# Patient Record
Sex: FEMALE | Race: WHITE | NOT HISPANIC OR LATINO | Employment: FULL TIME | URBAN - METROPOLITAN AREA
[De-identification: names, ages, dates, MRNs, and addresses within clinical notes are randomized per-mention and may not be internally consistent; named-entity substitution may affect disease eponyms.]

---

## 2017-08-18 ENCOUNTER — CONVERSION ENCOUNTER (OUTPATIENT)
Dept: URGENT CARE | Facility: CLINIC | Age: 57
End: 2017-08-18

## 2017-10-31 ENCOUNTER — CONVERSION ENCOUNTER (OUTPATIENT)
Dept: URGENT CARE | Facility: CLINIC | Age: 57
End: 2017-10-31

## 2019-06-03 VITALS
HEART RATE: 77 BPM | RESPIRATION RATE: 18 BRPM | DIASTOLIC BLOOD PRESSURE: 75 MMHG | HEIGHT: 67 IN | DIASTOLIC BLOOD PRESSURE: 77 MMHG | BODY MASS INDEX: 40.97 KG/M2 | OXYGEN SATURATION: 96 % | HEART RATE: 76 BPM | WEIGHT: 261 LBS | RESPIRATION RATE: 20 BRPM | OXYGEN SATURATION: 96 % | SYSTOLIC BLOOD PRESSURE: 130 MMHG | WEIGHT: 255.2 LBS | SYSTOLIC BLOOD PRESSURE: 146 MMHG

## 2020-06-16 ENCOUNTER — OFFICE (OUTPATIENT)
Dept: URBAN - METROPOLITAN AREA CLINIC 64 | Facility: CLINIC | Age: 60
End: 2020-06-16

## 2020-06-16 VITALS
HEIGHT: 66 IN | DIASTOLIC BLOOD PRESSURE: 88 MMHG | SYSTOLIC BLOOD PRESSURE: 173 MMHG | WEIGHT: 264 LBS | HEART RATE: 76 BPM

## 2020-06-16 DIAGNOSIS — R12 HEARTBURN: ICD-10-CM

## 2020-06-16 DIAGNOSIS — R10.84 GENERALIZED ABDOMINAL PAIN: ICD-10-CM

## 2020-06-16 DIAGNOSIS — Z86.010 PERSONAL HISTORY OF COLONIC POLYPS: ICD-10-CM

## 2020-06-16 DIAGNOSIS — B15.9 HEPATITIS A WITHOUT HEPATIC COMA: ICD-10-CM

## 2020-06-16 DIAGNOSIS — R21 RASH AND OTHER NONSPECIFIC SKIN ERUPTION: ICD-10-CM

## 2020-06-16 PROCEDURE — 99243 OFF/OP CNSLTJ NEW/EST LOW 30: CPT | Performed by: NURSE PRACTITIONER

## 2020-06-16 RX ORDER — PANTOPRAZOLE SODIUM 40 MG/1
40 TABLET, DELAYED RELEASE ORAL
Qty: 30 | Refills: 11 | Status: ACTIVE
Start: 2020-06-16

## 2020-06-16 RX ORDER — DICYCLOMINE HYDROCHLORIDE 20 MG/1
60 TABLET ORAL
Qty: 270 | Refills: 3 | Status: ACTIVE
Start: 2020-06-16

## 2021-10-25 ENCOUNTER — OFFICE VISIT (OUTPATIENT)
Dept: CARDIOLOGY | Facility: CLINIC | Age: 61
End: 2021-10-25

## 2021-10-25 ENCOUNTER — PREP FOR SURGERY (OUTPATIENT)
Dept: OTHER | Facility: HOSPITAL | Age: 61
End: 2021-10-25

## 2021-10-25 VITALS
OXYGEN SATURATION: 96 % | HEIGHT: 55 IN | BODY MASS INDEX: 54.62 KG/M2 | WEIGHT: 236 LBS | SYSTOLIC BLOOD PRESSURE: 119 MMHG | HEART RATE: 61 BPM | DIASTOLIC BLOOD PRESSURE: 70 MMHG

## 2021-10-25 DIAGNOSIS — R06.83 SNORING: ICD-10-CM

## 2021-10-25 DIAGNOSIS — I48.19 PERSISTENT ATRIAL FIBRILLATION (HCC): ICD-10-CM

## 2021-10-25 DIAGNOSIS — I10 HYPERTENSION, UNSPECIFIED TYPE: ICD-10-CM

## 2021-10-25 DIAGNOSIS — I48.19 PERSISTENT ATRIAL FIBRILLATION (HCC): Primary | ICD-10-CM

## 2021-10-25 DIAGNOSIS — E78.5 HYPERLIPIDEMIA, UNSPECIFIED HYPERLIPIDEMIA TYPE: ICD-10-CM

## 2021-10-25 DIAGNOSIS — R00.2 PALPITATIONS: Primary | ICD-10-CM

## 2021-10-25 PROBLEM — I48.0 AF (PAROXYSMAL ATRIAL FIBRILLATION) (HCC): Status: ACTIVE | Noted: 2021-10-25

## 2021-10-25 PROBLEM — E11.65 TYPE 2 DIABETES MELLITUS WITH HYPERGLYCEMIA (HCC): Status: ACTIVE | Noted: 2021-10-25

## 2021-10-25 PROCEDURE — 93000 ELECTROCARDIOGRAM COMPLETE: CPT | Performed by: INTERNAL MEDICINE

## 2021-10-25 PROCEDURE — 99204 OFFICE O/P NEW MOD 45 MIN: CPT | Performed by: INTERNAL MEDICINE

## 2021-10-25 RX ORDER — CITALOPRAM 20 MG/1
20 TABLET ORAL DAILY
COMMUNITY
Start: 2021-09-12

## 2021-10-25 RX ORDER — LISINOPRIL 5 MG/1
5 TABLET ORAL DAILY
COMMUNITY
Start: 2021-08-04

## 2021-10-25 RX ORDER — SODIUM CHLORIDE 9 MG/ML
80 INJECTION, SOLUTION INTRAVENOUS CONTINUOUS
Status: CANCELLED | OUTPATIENT
Start: 2021-10-25

## 2021-10-25 RX ORDER — FENOFIBRATE 134 MG/1
134 CAPSULE ORAL
COMMUNITY
Start: 2021-08-16

## 2021-10-25 RX ORDER — ACETAMINOPHEN 500 MG
500 TABLET ORAL EVERY 6 HOURS PRN
COMMUNITY

## 2021-10-25 RX ORDER — SODIUM CHLORIDE 0.9 % (FLUSH) 0.9 %
3-10 SYRINGE (ML) INJECTION AS NEEDED
Status: CANCELLED | OUTPATIENT
Start: 2021-10-25

## 2021-10-25 RX ORDER — SODIUM CHLORIDE 0.9 % (FLUSH) 0.9 %
3 SYRINGE (ML) INJECTION EVERY 12 HOURS SCHEDULED
Status: CANCELLED | OUTPATIENT
Start: 2021-10-25

## 2021-10-25 RX ORDER — SACCHAROMYCES BOULARDII 250 MG
250 CAPSULE ORAL 2 TIMES DAILY
COMMUNITY
End: 2021-11-15

## 2021-10-25 RX ORDER — APIXABAN 5 MG/1
5 TABLET, FILM COATED ORAL 2 TIMES DAILY
COMMUNITY
Start: 2021-10-19

## 2021-10-25 RX ORDER — GLIMEPIRIDE 4 MG/1
4 TABLET ORAL 2 TIMES DAILY
COMMUNITY
Start: 2021-08-26

## 2021-10-25 NOTE — PROGRESS NOTES
CC--persistent sx AF, HTN    Sub---referred for sx AF --failed cardiuoversion  Has HTN, congenital adrenal hyperplasia and prior CV work up negative  Has fatigue and snoring and PND  No angina  No lkeg edema  Has DM   no CVA or bleeding issues    Past Medical History:   Diagnosis Date   • Atrial fibrillation (HCC)    • Diabetes mellitus (HCC)    • Hyperlipidemia      Past Surgical History:   Procedure Laterality Date   • SHOULDER SURGERY       Family History   Problem Relation Age of Onset   • Heart failure Mother    • Hypertension Father          Review of Systems   General:  positive for fatigue and tiredness  Eyes: No redness  Cardiovascular: No chest pain, no palpitations  Respiratory:   positive for class 3 shortness of breath        Physical Exam  VITALS REVIEWED    General:      well developed, well nourished, in no acute distress.    Head:      normocephalic and atraumatic.    Eyes:      PERRL/EOM intact, conjunctiva and sclera clear with out nystagmus.    Neck:      no masses, thyromegaly,  trachea central with normal respiratory effort and PMI displaced laterally  Lungs:      clear bilaterally to auscultation.    Heart:       underlying  atrial fibrillation with irregularly irregular rhythm and without any murmurs gallops or rubs        A/P    Persistent sx AF--options educated and orders placed  Snoring with obesity--wt 236 lbs  HTN  DM  CHADS vasc score--female, diastolic chf, htn, DM--4      ECG 12 Lead    Date/Time: 10/25/2021 10:17 AM  Performed by: Andres Baer MD  Authorized by: Andres Baer MD   Comparison: compared with previous ECG   Similar to previous ECG  Rhythm: atrial fibrillation  Rate: normal  Conduction: conduction normal  QRS axis: normal  Other findings: non-specific ST-T wave changes          Electronically signed by Andres Baer MD, 10/25/21, 10:17 AM EDT.

## 2021-11-13 ENCOUNTER — LAB (OUTPATIENT)
Dept: LAB | Facility: HOSPITAL | Age: 61
End: 2021-11-13

## 2021-11-13 DIAGNOSIS — I48.19 PERSISTENT ATRIAL FIBRILLATION (HCC): ICD-10-CM

## 2021-11-13 DIAGNOSIS — R06.83 SNORING: ICD-10-CM

## 2021-11-13 DIAGNOSIS — I10 HYPERTENSION, UNSPECIFIED TYPE: ICD-10-CM

## 2021-11-13 LAB
ALBUMIN SERPL-MCNC: 4.7 G/DL (ref 3.5–5.2)
ALBUMIN/GLOB SERPL: 1.6 G/DL
ALP SERPL-CCNC: 42 U/L (ref 39–117)
ALT SERPL W P-5'-P-CCNC: 10 U/L (ref 1–33)
ANION GAP SERPL CALCULATED.3IONS-SCNC: 11.8 MMOL/L (ref 5–15)
AST SERPL-CCNC: 24 U/L (ref 1–32)
BASOPHILS # BLD AUTO: 0.06 10*3/MM3 (ref 0–0.2)
BASOPHILS NFR BLD AUTO: 0.7 % (ref 0–1.5)
BILIRUB SERPL-MCNC: 0.4 MG/DL (ref 0–1.2)
BUN SERPL-MCNC: 13 MG/DL (ref 8–23)
BUN/CREAT SERPL: 20.6 (ref 7–25)
CALCIUM SPEC-SCNC: 10 MG/DL (ref 8.6–10.5)
CHLORIDE SERPL-SCNC: 102 MMOL/L (ref 98–107)
CO2 SERPL-SCNC: 25.2 MMOL/L (ref 22–29)
CREAT SERPL-MCNC: 0.63 MG/DL (ref 0.57–1)
DEPRECATED RDW RBC AUTO: 46.6 FL (ref 37–54)
EOSINOPHIL # BLD AUTO: 0.12 10*3/MM3 (ref 0–0.4)
EOSINOPHIL NFR BLD AUTO: 1.4 % (ref 0.3–6.2)
ERYTHROCYTE [DISTWIDTH] IN BLOOD BY AUTOMATED COUNT: 13.9 % (ref 12.3–15.4)
GFR SERPL CREATININE-BSD FRML MDRD: 96 ML/MIN/1.73
GLOBULIN UR ELPH-MCNC: 2.9 GM/DL
GLUCOSE SERPL-MCNC: 125 MG/DL (ref 65–99)
HCT VFR BLD AUTO: 49.9 % (ref 34–46.6)
HGB BLD-MCNC: 16.6 G/DL (ref 12–15.9)
IMM GRANULOCYTES # BLD AUTO: 0.03 10*3/MM3 (ref 0–0.05)
IMM GRANULOCYTES NFR BLD AUTO: 0.3 % (ref 0–0.5)
LYMPHOCYTES # BLD AUTO: 3.39 10*3/MM3 (ref 0.7–3.1)
LYMPHOCYTES NFR BLD AUTO: 38.4 % (ref 19.6–45.3)
MAGNESIUM SERPL-MCNC: 2.1 MG/DL (ref 1.6–2.4)
MCH RBC QN AUTO: 30.3 PG (ref 26.6–33)
MCHC RBC AUTO-ENTMCNC: 33.3 G/DL (ref 31.5–35.7)
MCV RBC AUTO: 91.2 FL (ref 79–97)
MONOCYTES # BLD AUTO: 0.99 10*3/MM3 (ref 0.1–0.9)
MONOCYTES NFR BLD AUTO: 11.2 % (ref 5–12)
NEUTROPHILS NFR BLD AUTO: 4.24 10*3/MM3 (ref 1.7–7)
NEUTROPHILS NFR BLD AUTO: 48 % (ref 42.7–76)
NRBC BLD AUTO-RTO: 0 /100 WBC (ref 0–0.2)
PLATELET # BLD AUTO: 243 10*3/MM3 (ref 140–450)
PMV BLD AUTO: 10.2 FL (ref 6–12)
POTASSIUM SERPL-SCNC: 5.1 MMOL/L (ref 3.5–5.2)
PROT SERPL-MCNC: 7.6 G/DL (ref 6–8.5)
RBC # BLD AUTO: 5.47 10*6/MM3 (ref 3.77–5.28)
SODIUM SERPL-SCNC: 139 MMOL/L (ref 136–145)
WBC # BLD AUTO: 8.83 10*3/MM3 (ref 3.4–10.8)

## 2021-11-13 PROCEDURE — 80053 COMPREHEN METABOLIC PANEL: CPT

## 2021-11-13 PROCEDURE — 36415 COLL VENOUS BLD VENIPUNCTURE: CPT

## 2021-11-13 PROCEDURE — 83735 ASSAY OF MAGNESIUM: CPT

## 2021-11-13 PROCEDURE — 85025 COMPLETE CBC W/AUTO DIFF WBC: CPT

## 2021-11-13 PROCEDURE — U0004 COV-19 TEST NON-CDC HGH THRU: HCPCS

## 2021-11-13 PROCEDURE — C9803 HOPD COVID-19 SPEC COLLECT: HCPCS

## 2021-11-14 LAB — SARS-COV-2 ORF1AB RESP QL NAA+PROBE: NOT DETECTED

## 2021-11-15 ENCOUNTER — TELEPHONE (OUTPATIENT)
Dept: CARDIOLOGY | Facility: CLINIC | Age: 61
End: 2021-11-15

## 2021-11-15 NOTE — TELEPHONE ENCOUNTER
Patient called requesting letter be sent to her employer stating when and where Ablation is scheduled.  Patient also requested that she needed to know when she could return to work.  Advised patient that will be determined by Dr. Baer post procedure and could not be included in the above letter at this time.  Letter to be faxed to  [employer].

## 2021-11-16 ENCOUNTER — ANESTHESIA (OUTPATIENT)
Dept: CARDIOLOGY | Facility: HOSPITAL | Age: 61
End: 2021-11-16

## 2021-11-16 ENCOUNTER — ANESTHESIA EVENT (OUTPATIENT)
Dept: CARDIOLOGY | Facility: HOSPITAL | Age: 61
End: 2021-11-16

## 2021-11-16 ENCOUNTER — HOSPITAL ENCOUNTER (OUTPATIENT)
Facility: HOSPITAL | Age: 61
Discharge: HOME | End: 2021-11-17
Attending: INTERNAL MEDICINE | Admitting: INTERNAL MEDICINE

## 2021-11-16 ENCOUNTER — APPOINTMENT (OUTPATIENT)
Dept: GENERAL RADIOLOGY | Facility: HOSPITAL | Age: 61
End: 2021-11-16

## 2021-11-16 ENCOUNTER — HOSPITAL ENCOUNTER (OUTPATIENT)
Dept: CARDIOLOGY | Facility: HOSPITAL | Age: 61
Discharge: HOME | End: 2021-11-16

## 2021-11-16 VITALS
BODY MASS INDEX: 37.44 KG/M2 | DIASTOLIC BLOOD PRESSURE: 66 MMHG | TEMPERATURE: 98.1 F | WEIGHT: 238.54 LBS | OXYGEN SATURATION: 99 % | RESPIRATION RATE: 14 BRPM | HEIGHT: 67 IN | SYSTOLIC BLOOD PRESSURE: 137 MMHG

## 2021-11-16 VITALS — OXYGEN SATURATION: 96 % | SYSTOLIC BLOOD PRESSURE: 104 MMHG | DIASTOLIC BLOOD PRESSURE: 52 MMHG

## 2021-11-16 DIAGNOSIS — R06.83 SNORING: ICD-10-CM

## 2021-11-16 DIAGNOSIS — I48.19 PERSISTENT ATRIAL FIBRILLATION (HCC): ICD-10-CM

## 2021-11-16 DIAGNOSIS — I10 HYPERTENSION, UNSPECIFIED TYPE: ICD-10-CM

## 2021-11-16 LAB
ACT BLD: 130 SECONDS (ref 89–137)
ACT BLD: 291 SECONDS (ref 89–137)
ACT BLD: 303 SECONDS (ref 89–137)
ACT BLD: 303 SECONDS (ref 89–137)
ACT BLD: 315 SECONDS (ref 89–137)
BH CV ECHO MEAS - EF(MOD-BP): 60 %
GLUCOSE BLDC GLUCOMTR-MCNC: 137 MG/DL (ref 70–105)
GLUCOSE BLDC GLUCOMTR-MCNC: 148 MG/DL (ref 70–105)
GLUCOSE BLDC GLUCOMTR-MCNC: 186 MG/DL (ref 70–105)
MAXIMAL PREDICTED HEART RATE: 159 BPM
STRESS TARGET HR: 135 BPM

## 2021-11-16 PROCEDURE — 0 IOPAMIDOL PER 1 ML: Performed by: INTERNAL MEDICINE

## 2021-11-16 PROCEDURE — C1733 CATH, EP, OTHR THAN COOL-TIP: HCPCS | Performed by: INTERNAL MEDICINE

## 2021-11-16 PROCEDURE — 25010000002 PROPOFOL 10 MG/ML EMULSION: Performed by: ANESTHESIOLOGY

## 2021-11-16 PROCEDURE — 93325 DOPPLER ECHO COLOR FLOW MAPG: CPT

## 2021-11-16 PROCEDURE — G0378 HOSPITAL OBSERVATION PER HR: HCPCS

## 2021-11-16 PROCEDURE — 93662 INTRACARDIAC ECG (ICE): CPT | Performed by: INTERNAL MEDICINE

## 2021-11-16 PROCEDURE — C1766 INTRO/SHEATH,STRBLE,NON-PEEL: HCPCS | Performed by: INTERNAL MEDICINE

## 2021-11-16 PROCEDURE — 93320 DOPPLER ECHO COMPLETE: CPT

## 2021-11-16 PROCEDURE — 93312 ECHO TRANSESOPHAGEAL: CPT

## 2021-11-16 PROCEDURE — C1894 INTRO/SHEATH, NON-LASER: HCPCS | Performed by: INTERNAL MEDICINE

## 2021-11-16 PROCEDURE — 93320 DOPPLER ECHO COMPLETE: CPT | Performed by: INTERNAL MEDICINE

## 2021-11-16 PROCEDURE — 93613 INTRACARDIAC EPHYS 3D MAPG: CPT | Performed by: INTERNAL MEDICINE

## 2021-11-16 PROCEDURE — C1732 CATH, EP, DIAG/ABL, 3D/VECT: HCPCS | Performed by: INTERNAL MEDICINE

## 2021-11-16 PROCEDURE — 71045 X-RAY EXAM CHEST 1 VIEW: CPT

## 2021-11-16 PROCEDURE — 93325 DOPPLER ECHO COLOR FLOW MAPG: CPT | Performed by: INTERNAL MEDICINE

## 2021-11-16 PROCEDURE — 25010000002 HEPARIN (PORCINE) PER 1000 UNITS: Performed by: ANESTHESIOLOGY

## 2021-11-16 PROCEDURE — C1759 CATH, INTRA ECHOCARDIOGRAPHY: HCPCS | Performed by: INTERNAL MEDICINE

## 2021-11-16 PROCEDURE — 93656 COMPRE EP EVAL ABLTJ ATR FIB: CPT | Performed by: INTERNAL MEDICINE

## 2021-11-16 PROCEDURE — 25010000002 FENTANYL CITRATE (PF) 100 MCG/2ML SOLUTION: Performed by: ANESTHESIOLOGY

## 2021-11-16 PROCEDURE — 25010000002 ONDANSETRON PER 1 MG: Performed by: ANESTHESIOLOGY

## 2021-11-16 PROCEDURE — 82962 GLUCOSE BLOOD TEST: CPT

## 2021-11-16 PROCEDURE — C1769 GUIDE WIRE: HCPCS | Performed by: INTERNAL MEDICINE

## 2021-11-16 PROCEDURE — C1893 INTRO/SHEATH, FIXED,NON-PEEL: HCPCS | Performed by: INTERNAL MEDICINE

## 2021-11-16 PROCEDURE — 93312 ECHO TRANSESOPHAGEAL: CPT | Performed by: INTERNAL MEDICINE

## 2021-11-16 PROCEDURE — 25010000002 PROTAMINE SULFATE PER 10 MG: Performed by: ANESTHESIOLOGY

## 2021-11-16 PROCEDURE — 25010000002 MORPHINE PER 10 MG: Performed by: ANESTHESIOLOGY

## 2021-11-16 PROCEDURE — C1730 CATH, EP, 19 OR FEW ELECT: HCPCS | Performed by: INTERNAL MEDICINE

## 2021-11-16 PROCEDURE — 25010000002 SUCCINYLCHOLINE PER 20 MG: Performed by: ANESTHESIOLOGY

## 2021-11-16 PROCEDURE — 85347 COAGULATION TIME ACTIVATED: CPT

## 2021-11-16 PROCEDURE — 25010000002 DEXAMETHASONE PER 1 MG: Performed by: ANESTHESIOLOGY

## 2021-11-16 RX ORDER — SUCCINYLCHOLINE CHLORIDE 20 MG/ML
INJECTION INTRAMUSCULAR; INTRAVENOUS AS NEEDED
Status: DISCONTINUED | OUTPATIENT
Start: 2021-11-16 | End: 2021-11-16 | Stop reason: SURG

## 2021-11-16 RX ORDER — LIDOCAINE HYDROCHLORIDE 20 MG/ML
INJECTION, SOLUTION EPIDURAL; INFILTRATION; INTRACAUDAL; PERINEURAL AS NEEDED
Status: DISCONTINUED | OUTPATIENT
Start: 2021-11-16 | End: 2021-11-16 | Stop reason: SURG

## 2021-11-16 RX ORDER — ONDANSETRON 2 MG/ML
4 INJECTION INTRAMUSCULAR; INTRAVENOUS EVERY 6 HOURS PRN
Status: DISCONTINUED | OUTPATIENT
Start: 2021-11-16 | End: 2021-11-17 | Stop reason: HOSPADM

## 2021-11-16 RX ORDER — ACETAMINOPHEN 325 MG/1
650 TABLET ORAL ONCE AS NEEDED
Status: DISCONTINUED | OUTPATIENT
Start: 2021-11-16 | End: 2021-11-16 | Stop reason: HOSPADM

## 2021-11-16 RX ORDER — ACETAMINOPHEN 500 MG
500 TABLET ORAL EVERY 6 HOURS PRN
Status: DISCONTINUED | OUTPATIENT
Start: 2021-11-16 | End: 2021-11-17 | Stop reason: HOSPADM

## 2021-11-16 RX ORDER — HEPARIN SODIUM 1000 [USP'U]/ML
INJECTION, SOLUTION INTRAVENOUS; SUBCUTANEOUS AS NEEDED
Status: DISCONTINUED | OUTPATIENT
Start: 2021-11-16 | End: 2021-11-16 | Stop reason: SURG

## 2021-11-16 RX ORDER — PROTAMINE SULFATE 10 MG/ML
INJECTION, SOLUTION INTRAVENOUS AS NEEDED
Status: DISCONTINUED | OUTPATIENT
Start: 2021-11-16 | End: 2021-11-16 | Stop reason: SURG

## 2021-11-16 RX ORDER — MORPHINE SULFATE 4 MG/ML
2 INJECTION, SOLUTION INTRAMUSCULAR; INTRAVENOUS
Status: DISCONTINUED | OUTPATIENT
Start: 2021-11-16 | End: 2021-11-16 | Stop reason: HOSPADM

## 2021-11-16 RX ORDER — HYDROCODONE BITARTRATE AND ACETAMINOPHEN 5; 325 MG/1; MG/1
1 TABLET ORAL EVERY 4 HOURS PRN
Status: DISCONTINUED | OUTPATIENT
Start: 2021-11-16 | End: 2021-11-17 | Stop reason: HOSPADM

## 2021-11-16 RX ORDER — MORPHINE SULFATE 4 MG/ML
INJECTION, SOLUTION INTRAMUSCULAR; INTRAVENOUS AS NEEDED
Status: DISCONTINUED | OUTPATIENT
Start: 2021-11-16 | End: 2021-11-16 | Stop reason: SURG

## 2021-11-16 RX ORDER — NALOXONE HCL 0.4 MG/ML
0.4 VIAL (ML) INJECTION
Status: DISCONTINUED | OUTPATIENT
Start: 2021-11-16 | End: 2021-11-17 | Stop reason: HOSPADM

## 2021-11-16 RX ORDER — SODIUM CHLORIDE 9 MG/ML
30 INJECTION, SOLUTION INTRAVENOUS CONTINUOUS
Status: DISCONTINUED | OUTPATIENT
Start: 2021-11-16 | End: 2021-11-17 | Stop reason: HOSPADM

## 2021-11-16 RX ORDER — FENTANYL CITRATE 50 UG/ML
INJECTION, SOLUTION INTRAMUSCULAR; INTRAVENOUS AS NEEDED
Status: DISCONTINUED | OUTPATIENT
Start: 2021-11-16 | End: 2021-11-16 | Stop reason: SURG

## 2021-11-16 RX ORDER — GLIPIZIDE 5 MG/1
10 TABLET ORAL
Status: DISCONTINUED | OUTPATIENT
Start: 2021-11-16 | End: 2021-11-17 | Stop reason: HOSPADM

## 2021-11-16 RX ORDER — CITALOPRAM 20 MG/1
20 TABLET ORAL DAILY
Status: DISCONTINUED | OUTPATIENT
Start: 2021-11-16 | End: 2021-11-17 | Stop reason: HOSPADM

## 2021-11-16 RX ORDER — ACETAMINOPHEN 650 MG/1
650 SUPPOSITORY RECTAL ONCE AS NEEDED
Status: DISCONTINUED | OUTPATIENT
Start: 2021-11-16 | End: 2021-11-16 | Stop reason: HOSPADM

## 2021-11-16 RX ORDER — ONDANSETRON 2 MG/ML
4 INJECTION INTRAMUSCULAR; INTRAVENOUS ONCE AS NEEDED
Status: COMPLETED | OUTPATIENT
Start: 2021-11-16 | End: 2021-11-16

## 2021-11-16 RX ORDER — GLYCOPYRROLATE 1 MG/5 ML
SYRINGE (ML) INTRAVENOUS AS NEEDED
Status: DISCONTINUED | OUTPATIENT
Start: 2021-11-16 | End: 2021-11-16 | Stop reason: SURG

## 2021-11-16 RX ORDER — PANTOPRAZOLE SODIUM 40 MG/10ML
INJECTION, POWDER, LYOPHILIZED, FOR SOLUTION INTRAVENOUS AS NEEDED
Status: DISCONTINUED | OUTPATIENT
Start: 2021-11-16 | End: 2021-11-16 | Stop reason: SURG

## 2021-11-16 RX ORDER — DEXAMETHASONE SODIUM PHOSPHATE 4 MG/ML
INJECTION, SOLUTION INTRA-ARTICULAR; INTRALESIONAL; INTRAMUSCULAR; INTRAVENOUS; SOFT TISSUE AS NEEDED
Status: DISCONTINUED | OUTPATIENT
Start: 2021-11-16 | End: 2021-11-16 | Stop reason: SURG

## 2021-11-16 RX ORDER — MORPHINE SULFATE 4 MG/ML
1 INJECTION, SOLUTION INTRAMUSCULAR; INTRAVENOUS EVERY 4 HOURS PRN
Status: DISCONTINUED | OUTPATIENT
Start: 2021-11-16 | End: 2021-11-17 | Stop reason: HOSPADM

## 2021-11-16 RX ORDER — PROPOFOL 10 MG/ML
VIAL (ML) INTRAVENOUS AS NEEDED
Status: DISCONTINUED | OUTPATIENT
Start: 2021-11-16 | End: 2021-11-16 | Stop reason: SURG

## 2021-11-16 RX ORDER — LISINOPRIL 5 MG/1
5 TABLET ORAL DAILY
Status: DISCONTINUED | OUTPATIENT
Start: 2021-11-16 | End: 2021-11-17 | Stop reason: HOSPADM

## 2021-11-16 RX ORDER — OXYCODONE HYDROCHLORIDE 5 MG/1
7.5 TABLET ORAL ONCE AS NEEDED
Status: DISCONTINUED | OUTPATIENT
Start: 2021-11-16 | End: 2021-11-16 | Stop reason: HOSPADM

## 2021-11-16 RX ORDER — LIDOCAINE HYDROCHLORIDE 10 MG/ML
INJECTION, SOLUTION EPIDURAL; INFILTRATION; INTRACAUDAL; PERINEURAL AS NEEDED
Status: DISCONTINUED | OUTPATIENT
Start: 2021-11-16 | End: 2021-11-16 | Stop reason: SURG

## 2021-11-16 RX ADMIN — SODIUM CHLORIDE 30 ML/HR: 9 INJECTION, SOLUTION INTRAVENOUS at 06:53

## 2021-11-16 RX ADMIN — HEPARIN SODIUM 8000 UNITS: 1000 INJECTION, SOLUTION INTRAVENOUS; SUBCUTANEOUS at 09:03

## 2021-11-16 RX ADMIN — PROPOFOL 150 MG: 10 INJECTION, EMULSION INTRAVENOUS at 08:13

## 2021-11-16 RX ADMIN — GLIPIZIDE 10 MG: 5 TABLET ORAL at 14:49

## 2021-11-16 RX ADMIN — PANTOPRAZOLE SODIUM 40 MG: 40 INJECTION, POWDER, FOR SOLUTION INTRAVENOUS at 09:36

## 2021-11-16 RX ADMIN — HYDROCODONE BITARTRATE AND ACETAMINOPHEN 1 TABLET: 5; 325 TABLET ORAL at 21:17

## 2021-11-16 RX ADMIN — SODIUM CHLORIDE: 9 INJECTION, SOLUTION INTRAVENOUS at 10:02

## 2021-11-16 RX ADMIN — LIDOCAINE HYDROCHLORIDE 50 MG: 20 INJECTION, SOLUTION EPIDURAL; INFILTRATION; INTRACAUDAL; PERINEURAL at 08:33

## 2021-11-16 RX ADMIN — HEPARIN SODIUM 2000 UNITS: 1000 INJECTION, SOLUTION INTRAVENOUS; SUBCUTANEOUS at 09:42

## 2021-11-16 RX ADMIN — PROPOFOL 170 MG: 10 INJECTION, EMULSION INTRAVENOUS at 08:33

## 2021-11-16 RX ADMIN — HEPARIN SODIUM 1000 UNITS: 1000 INJECTION, SOLUTION INTRAVENOUS; SUBCUTANEOUS at 09:17

## 2021-11-16 RX ADMIN — SUCCINYLCHOLINE CHLORIDE 100 MG: 20 INJECTION INTRAMUSCULAR; INTRAVENOUS at 08:33

## 2021-11-16 RX ADMIN — MORPHINE SULFATE 4 MG: 4 INJECTION INTRAVENOUS at 09:36

## 2021-11-16 RX ADMIN — FAMOTIDINE 20 MG: 10 INJECTION INTRAVENOUS at 09:08

## 2021-11-16 RX ADMIN — Medication 0.4 MG: at 08:43

## 2021-11-16 RX ADMIN — LISINOPRIL 5 MG: 5 TABLET ORAL at 14:50

## 2021-11-16 RX ADMIN — MORPHINE SULFATE 4 MG: 4 INJECTION INTRAVENOUS at 08:54

## 2021-11-16 RX ADMIN — APIXABAN 5 MG: 5 TABLET, FILM COATED ORAL at 21:11

## 2021-11-16 RX ADMIN — CITALOPRAM HYDROBROMIDE 20 MG: 20 TABLET ORAL at 14:48

## 2021-11-16 RX ADMIN — HEPARIN SODIUM 2000 UNITS: 1000 INJECTION, SOLUTION INTRAVENOUS; SUBCUTANEOUS at 10:03

## 2021-11-16 RX ADMIN — DEXAMETHASONE SODIUM PHOSPHATE 4 MG: 4 INJECTION, SOLUTION INTRAMUSCULAR; INTRAVENOUS at 08:33

## 2021-11-16 RX ADMIN — PROTAMINE SULFATE 100 MG: 10 INJECTION, SOLUTION INTRAVENOUS at 10:27

## 2021-11-16 RX ADMIN — FENTANYL CITRATE 100 MCG: 50 INJECTION, SOLUTION INTRAMUSCULAR; INTRAVENOUS at 08:33

## 2021-11-16 RX ADMIN — HEPARIN SODIUM 5000 UNITS: 1000 INJECTION, SOLUTION INTRAVENOUS; SUBCUTANEOUS at 09:00

## 2021-11-16 RX ADMIN — LIDOCAINE HYDROCHLORIDE 50 MG: 10 INJECTION, SOLUTION EPIDURAL; INFILTRATION; INTRACAUDAL; PERINEURAL at 08:15

## 2021-11-16 RX ADMIN — ONDANSETRON 4 MG: 2 INJECTION INTRAMUSCULAR; INTRAVENOUS at 10:27

## 2021-11-16 NOTE — ANESTHESIA PREPROCEDURE EVALUATION
Anesthesia Evaluation     Patient summary reviewed and Nursing notes reviewed   NPO Solid Status: > 8 hours  NPO Liquid Status: > 8 hours           Airway   Mallampati: II  TM distance: >3 FB  Neck ROM: full  No difficulty expected  Dental - normal exam     Pulmonary - normal exam   Cardiovascular     ECG reviewed  Rhythm: irregular    (+) hypertension, dysrhythmias Atrial Fib, hyperlipidemia,       Neuro/Psych  GI/Hepatic/Renal/Endo    (+) obesity,   diabetes mellitus,     Musculoskeletal     Abdominal  - normal exam    Bowel sounds: normal.   Substance History      OB/GYN          Other        ROS/Med Hx Other:  AF     Echo 2020  Nl LVSF   Dil rv                 Anesthesia Plan    ASA 3     general     intravenous induction     Anesthetic plan, all risks, benefits, and alternatives have been provided, discussed and informed consent has been obtained with: patient.

## 2021-11-16 NOTE — PLAN OF CARE
Problem: Adult Inpatient Plan of Care  Goal: Plan of Care Review  11/16/2021 1459 by Mickie Uribe RN  Outcome: Ongoing, Progressing  11/16/2021 1459 by Mickie Uribe RN  Outcome: Ongoing, Progressing  Goal: Patient-Specific Goal (Individualized)  11/16/2021 1459 by Mickie Uribe RN  Outcome: Ongoing, Progressing  11/16/2021 1459 by Mickie Uribe RN  Outcome: Ongoing, Progressing  Goal: Absence of Hospital-Acquired Illness or Injury  11/16/2021 1459 by Mickie Uribe RN  Outcome: Ongoing, Progressing  11/16/2021 1459 by Mickie Uribe RN  Outcome: Ongoing, Progressing  Intervention: Identify and Manage Fall Risk  Recent Flowsheet Documentation  Taken 11/16/2021 1430 by Mickie Uribe RN  Safety Promotion/Fall Prevention: safety round/check completed  Taken 11/16/2021 1400 by Mickie Uribe RN  Safety Promotion/Fall Prevention: safety round/check completed  Taken 11/16/2021 1330 by Mickie Uribe RN  Safety Promotion/Fall Prevention: safety round/check completed  Taken 11/16/2021 1300 by Mickie Uribe RN  Safety Promotion/Fall Prevention: safety round/check completed  Intervention: Prevent Infection  Recent Flowsheet Documentation  Taken 11/16/2021 1430 by Mickie Uribe RN  Infection Prevention: personal protective equipment utilized  Taken 11/16/2021 1400 by Mickie Uribe RN  Infection Prevention: personal protective equipment utilized  Taken 11/16/2021 1330 by Mickie Uribe RN  Infection Prevention: personal protective equipment utilized  Taken 11/16/2021 1300 by Mickie Uribe RN  Infection Prevention: personal protective equipment utilized  Goal: Optimal Comfort and Wellbeing  11/16/2021 1459 by Mickie Uribe RN  Outcome: Ongoing, Progressing  11/16/2021 1459 by Mickie Uribe RN  Outcome: Ongoing, Progressing  Intervention: Provide Person-Centered Care  Recent Flowsheet Documentation  Taken 11/16/2021 1150 by Mickie Uribe RN  Trust Relationship/Rapport:   care explained   choices provided   emotional support  provided   empathic listening provided   questions answered   questions encouraged   reassurance provided   thoughts/feelings acknowledged  Goal: Readiness for Transition of Care  11/16/2021 1459 by Mickie Uribe, RN  Outcome: Ongoing, Progressing  11/16/2021 1459 by Mickie Uribe, RN  Outcome: Ongoing, Progressing   Goal Outcome Evaluation:

## 2021-11-16 NOTE — CONSULTS
"[facility]         CONSULT NOTE    Patient Identification:  Maria M Arreola  61 y.o.  female  1960  [id number]            Requesting physician: Buffy    Reason for Consultation: Sleep Apnea        History of Present Illness: 62 y/o female presented to [facility] for a scheduled EP study with Dr. Baer. She presents with h/o obesity, HLD, Afib, congenital adrenal hyperplasia, current smoker, and diabetes.     Assessment:  Sleep Apnea  Snoring  Obesity  Current daily smoker    HLD  Afib  DM  Congenital adrenal hyperplasia    Discussed with patient about sleep apnea. She reports having been diagnosed with it in the past. She isn't sure she wants to \"mess with a cpap machine especially if insurance will not cover it\". She also expressed she didn't want to do another sleep study because of all the \"wires and cords\". I explained that sleep study could be done in the convienence of her own home and she could consider seeing Dr. Ferrari outpatient and the office staff can assist with finding out if insurance will cover a machine or not. She is going to \"think about it\".     Recommendations:  CXR ordered     Continue to monitor oxygen- Currently on room air.  DVT prophylaxis- Eliquis    Patient will benefit for outpatient sleep apnea work up.       Review of Sytems:  Review of Systems   Respiratory: Positive for cough and shortness of breath.    All other systems reviewed and are negative.      Past Medical History:  Past Medical History:   Diagnosis Date   • Atrial fibrillation (HCC)    • Diabetes mellitus (HCC)    • Hyperlipidemia        Past Surgical History:  Past Surgical History:   Procedure Laterality Date   • CRYOABLATION  11/16/2021   • SHOULDER SURGERY          Home Meds:  Medications Prior to Admission   Medication Sig Dispense Refill Last Dose   • acetaminophen (TYLENOL) 500 MG tablet Take 500 mg by mouth Every 6 (Six) Hours As Needed for Mild Pain .      • citalopram " (CeleXA) 20 MG tablet Take 20 mg by mouth Daily.      • Eliquis 5 MG tablet tablet Take 5 mg by mouth 2 (Two) Times a Day.      • fenofibrate micronized (LOFIBRA) 134 MG capsule Take 134 mg by mouth every night at bedtime.      • glimepiride (AMARYL) 4 MG tablet Take 4 mg by mouth 2 (Two) Times a Day. BID      • lisinopril (PRINIVIL,ZESTRIL) 5 MG tablet Take 5 mg by mouth Daily.      • Probiotic Product (PROBIOTIC-10 PO) Take 1 capsule by mouth Every Evening.          Allergies:  Allergies   Allergen Reactions   • Red Dye Other (See Comments)   • Statins Other (See Comments)   • Aspirin Hives   • Ibuprofen Hives   • Nsaids Other (See Comments)     Other         Social History:   Social History     Socioeconomic History   • Marital status:    Tobacco Use   • Smoking status: Current Every Day Smoker     Packs/day: 1.00   • Smokeless tobacco: Never Used   Vaping Use   • Vaping Use: Never used   Substance and Sexual Activity   • Alcohol use: Not Currently   • Drug use: Never   • Sexual activity: Defer       Family History:  Family History   Problem Relation Age of Onset   • Heart failure Mother    • Hypertension Father        Physical Exam:  /60 (BP Location: Right arm, Patient Position: Lying)   Pulse 59   Temp 98.3 °F (36.8 °C) (Temporal)   Resp 13   SpO2 97%  There is no height or weight on file to calculate BMI. 97%    Physical Exam  Vitals reviewed.   Constitutional:       Appearance: She is obese.   Pulmonary:      Effort: Pulmonary effort is normal.      Breath sounds: Examination of the right-lower field reveals decreased breath sounds. Examination of the left-lower field reveals decreased breath sounds. Decreased breath sounds present.   Skin:     General: Skin is warm and dry.   Neurological:      Mental Status: She is alert and oriented to person, place, and time.         LABS:  Lab Results   Component Value Date    CALCIUM 10.0 11/13/2021     Results from last 7 days   Lab Units  11/13/21  1002   MAGNESIUM mg/dL 2.1   SODIUM mmol/L 139   POTASSIUM mmol/L 5.1   CHLORIDE mmol/L 102   CO2 mmol/L 25.2   BUN mg/dL 13   CREATININE mg/dL 0.63   GLUCOSE mg/dL 125*   CALCIUM mg/dL 10.0   WBC 10*3/mm3 8.83   HEMOGLOBIN g/dL 16.6*   PLATELETS 10*3/mm3 243   ALT (SGPT) U/L 10   AST (SGOT) U/L 24     No results found for: CKTOTAL, CKMB, CKMBINDEX, TROPONINI, TROPONINT                              No results found for: TSH  Estimated Creatinine Clearance: 118.7 mL/min (by C-G formula based on SCr of 0.63 mg/dL).         Imaging:  Imaging Results (Last 24 Hours)     ** No results found for the last 24 hours. **            Current Meds:   SCHEDULE  apixaban, 5 mg, Oral, BID  citalopram, 20 mg, Oral, Daily  glipizide, 10 mg, Oral, QAM AC  lisinopril, 5 mg, Oral, Daily      Infusions  sodium chloride, 30 mL/hr, Last Rate: 30 mL/hr (11/16/21 0653)      PRNs  •  acetaminophen  •  HYDROcodone-acetaminophen  •  Morphine **AND** naloxone  •  ondansetron        Regla Lindsay, [place]  11/16/2021  13:19 EST      Much of this encounter note is an electronic transcription/translation of spoken language to printed text using Dragon Software.

## 2021-11-16 NOTE — ANESTHESIA PROCEDURE NOTES
Airway  Urgency: elective    Date/Time: 11/16/2021 8:36 AM  Airway not difficult    General Information and Staff    Patient location during procedure: OR  Anesthesiologist: Young Craig MD    Indications and Patient Condition  Indications for airway management: airway protection    Preoxygenated: yes  MILS not maintained throughout  Mask difficulty assessment: 1 - vent by mask    Final Airway Details  Final airway type: endotracheal airway      Successful airway: ETT  Cuffed: yes   Successful intubation technique: direct laryngoscopy  Endotracheal tube insertion site: oral  Blade: Capps  Blade size: 4  ETT size (mm): 7.5  Cormack-Lehane Classification: grade I - full view of glottis  Placement verified by: capnometry and palpation of cuff   Measured from: lips  ETT/EBT  to lips (cm): 21  Number of attempts at approach: 1  Assessment: lips, teeth, and gum same as pre-op and atraumatic intubation    Additional Comments  ASA monitors applied; preoxygenated with 100% FiO2 via anesthesia face mask; induction of general anesthesia; bag-mask ventilation; patient's position optimized; laryngoscopy; cuffed ETT lubricated with lidocaine jelly and placed into the trachea; cuff inflated to seal with minimally occlusive airway cuff pressure; ETT connected to anesthesia circuit; atraumatic/dentition in preoperative condition; ETT secured in place; correct placement in the trachea confirmed by bilateral chest rise, tube condensation, and return of EtCO2 > 30 mmHg x3

## 2021-11-16 NOTE — PLAN OF CARE
Problem: Adult Inpatient Plan of Care  Goal: Plan of Care Review  Outcome: Ongoing, Progressing  Goal: Patient-Specific Goal (Individualized)  Outcome: Ongoing, Progressing  Goal: Absence of Hospital-Acquired Illness or Injury  Outcome: Ongoing, Progressing  Intervention: Identify and Manage Fall Risk  Recent Flowsheet Documentation  Taken 11/16/2021 1430 by Mickie Uribe RN  Safety Promotion/Fall Prevention: safety round/check completed  Taken 11/16/2021 1400 by Mickie Uribe RN  Safety Promotion/Fall Prevention: safety round/check completed  Taken 11/16/2021 1330 by Mickie Uribe RN  Safety Promotion/Fall Prevention: safety round/check completed  Taken 11/16/2021 1300 by Mickie Uribe RN  Safety Promotion/Fall Prevention: safety round/check completed  Intervention: Prevent Infection  Recent Flowsheet Documentation  Taken 11/16/2021 1430 by Mickie Uribe RN  Infection Prevention: personal protective equipment utilized  Taken 11/16/2021 1400 by Mickie Uribe RN  Infection Prevention: personal protective equipment utilized  Taken 11/16/2021 1330 by Mickie Uribe RN  Infection Prevention: personal protective equipment utilized  Taken 11/16/2021 1300 by Mickie Uribe RN  Infection Prevention: personal protective equipment utilized  Goal: Optimal Comfort and Wellbeing  Outcome: Ongoing, Progressing  Intervention: Provide Person-Centered Care  Recent Flowsheet Documentation  Taken 11/16/2021 1150 by Mickie Uribe RN  Trust Relationship/Rapport:   care explained   choices provided   emotional support provided   empathic listening provided   questions answered   questions encouraged   reassurance provided   thoughts/feelings acknowledged  Goal: Readiness for Transition of Care  Outcome: Ongoing, Progressing   Goal Outcome Evaluation:

## 2021-11-16 NOTE — ANESTHESIA POSTPROCEDURE EVALUATION
Patient: Maria M Arreola    Procedure Summary     Date: 11/16/21 Room / Location: [place] CATH LAB 3 [facility] CATH INVASIVE LOCATION    Anesthesia Start: 0813 Anesthesia Stop:     Procedures:       EP/Ablation (N/A )      Intracardiac echocardiogram (N/A )      Cardioversion (N/A ) Diagnosis:       Persistent atrial fibrillation (HCC)      Hypertension, unspecified type      Snoring      (af)    Providers: Andres Baer MD Provider: Young Craig MD    Anesthesia Type: general ASA Status: 3          Anesthesia Type: general    Vitals  Vitals Value Taken Time   /52 11/16/21 0819   Temp     Pulse     Resp     SpO2 96 % 11/16/21 0820           Post Anesthesia Care and Evaluation    Patient location during evaluation: PACU  Patient participation: complete - patient cannot participate  Level of consciousness: responsive to light touch, responsive to physical stimuli and responsive to verbal stimuli  Pain score: 0  Pain management: adequate  Airway patency: patent  Anesthetic complications: No anesthetic complications  PONV Status: controlled  Cardiovascular status: acceptable and hemodynamically stable  Respiratory status: acceptable, face mask and oral airway  Hydration status: acceptable    Comments: Satisfactory progress.Patient seen and examined postoperatively; vital signs stable; SpO2 greater than or equal to 90%; cardiopulmonary status stable; nausea/vomiting adequately controlled; pain adequately controlled; no apparent anesthesia complications; patient discharged from anesthesia care when discharge criteria were met

## 2021-11-17 VITALS
HEIGHT: 67 IN | HEART RATE: 52 BPM | BODY MASS INDEX: 37.37 KG/M2 | SYSTOLIC BLOOD PRESSURE: 107 MMHG | TEMPERATURE: 98 F | RESPIRATION RATE: 20 BRPM | DIASTOLIC BLOOD PRESSURE: 51 MMHG | WEIGHT: 238.1 LBS | OXYGEN SATURATION: 96 %

## 2021-11-17 LAB — QT INTERVAL: 460 MS

## 2021-11-17 PROCEDURE — 99217 PR OBSERVATION CARE DISCHARGE MANAGEMENT: CPT | Performed by: NURSE PRACTITIONER

## 2021-11-17 PROCEDURE — 93005 ELECTROCARDIOGRAM TRACING: CPT | Performed by: INTERNAL MEDICINE

## 2021-11-17 PROCEDURE — G0378 HOSPITAL OBSERVATION PER HR: HCPCS

## 2021-11-17 RX ORDER — AMOXICILLIN AND CLAVULANATE POTASSIUM 500; 125 MG/1; MG/1
1 TABLET, FILM COATED ORAL 2 TIMES DAILY
Qty: 14 TABLET | Refills: 0 | Status: SHIPPED | OUTPATIENT
Start: 2021-11-17 | End: 2021-11-24

## 2021-11-17 RX ADMIN — HYDROCODONE BITARTRATE AND ACETAMINOPHEN 1 TABLET: 5; 325 TABLET ORAL at 04:18

## 2021-11-17 RX ADMIN — LIDOCAINE HYDROCHLORIDE: 20 SOLUTION ORAL; TOPICAL at 08:43

## 2021-11-17 RX ADMIN — HYDROCODONE BITARTRATE AND ACETAMINOPHEN 1 TABLET: 5; 325 TABLET ORAL at 08:25

## 2021-11-17 NOTE — DISCHARGE INSTRUCTIONS
Keflex 500 mg 3 times daily x7 days    Tessalon Perles for cough-patient states she has some at home    Recommend Protonix-patient states she does not want to take, Rx not sent    Saline gargles 4 times daily    Follow-up 1 week NP    Follow-up Dr. Baer 3-4 weeks      Off work until evaluated in our office in 1 week

## 2021-11-17 NOTE — DISCHARGE SUMMARY
Cardiology Short Stay Note      Patient Care Team:  Lilly Oneal MD as PCP - General (Internal Medicine)    CHIEF COMPLAINT:   Symptomatic atrial fibrillation with failed prior cardioversion      PATIENT IDENTIFICATION  Name: Maria M Arreola  Age: 61 y.o.  Sex: female  :  1960  MRN: [id number]             HISTORY OF PRESENT ILLNESS:   61-year-old female with known history of persistent atrial fibrillation who failed previous cardioversion.  Additional history of diabetes mellitus 2, dyslipidemia, morbid exogenous obesity, obstructive sleep apnea, current everyday smoker.      ADMISSION  DIAGNOSIS    Persistent atrial fibrillation (HCC)    Hypertension    Snoring          DISCHARGE DIAGNOSIS    Persistent atrial fibrillation (HCC)    Hypertension    Snoring  Status post radiofrequency ablation for atrial fibrillation  Chest discomfort-musculoskeletal    Past Medical History:   Diagnosis Date   • Atrial fibrillation (HCC)    • Diabetes mellitus (HCC)    • Hyperlipidemia      Past Surgical History:   Procedure Laterality Date   • CRYOABLATION  2021   • SHOULDER SURGERY       Family History   Problem Relation Age of Onset   • Heart failure Mother    • Hypertension Father      Social History     Tobacco Use   • Smoking status: Current Every Day Smoker     Packs/day: 1.00   • Smokeless tobacco: Never Used   Vaping Use   • Vaping Use: Never used   Substance Use Topics   • Alcohol use: Not Currently   • Drug use: Never     Medications Prior to Admission   Medication Sig Dispense Refill Last Dose   • acetaminophen (TYLENOL) 500 MG tablet Take 500 mg by mouth Every 6 (Six) Hours As Needed for Mild Pain .      • citalopram (CeleXA) 20 MG tablet Take 20 mg by mouth Daily.      • Eliquis 5 MG tablet tablet Take 5 mg by mouth 2 (Two) Times a Day.      • fenofibrate micronized (LOFIBRA) 134 MG capsule Take 134 mg by mouth every night at bedtime.      • glimepiride (AMARYL) 4 MG tablet Take 4  "mg by mouth 2 (Two) Times a Day. BID      • lisinopril (PRINIVIL,ZESTRIL) 5 MG tablet Take 5 mg by mouth Daily.      • Probiotic Product (PROBIOTIC-10 PO) Take 1 capsule by mouth Every Evening.        Allergies:  Red dye, Statins, Aspirin, Ibuprofen, and Nsaids      There is no immunization history on file for this patient.        REVIEW OF SYSTEMS:  Pertinent items are noted in HPI, all other systems reviewed and negative    Vital Signs  Temp:  [98 °F (36.7 °C)-98.6 °F (37 °C)] 98 °F (36.7 °C)  Heart Rate:  [58-65] 64  Resp:  [12-20] 20  BP: ()/(46-74) 116/62    Flowsheet Rows      First Filed Value   Admission Height 170.2 cm (67\") Documented at 11/16/2021 1518   Admission Weight 108 kg (238 lb 1.6 oz) Documented at 11/16/2021 1518           Physical Exam:    General: Alert, cooperative, no distress, appears stated age  Head:  Normocephalic, atraumatic, mucous membranes moist  Eyes:  Conjunctivae/corneas clear, EOM's intact     Neck:  Supple,  no adenopathy;      Lungs: Clear to auscultation bilaterally, no wheezes rhonchi rales are noted  Chest wall: No tenderness  Heart::  Regular rate and rhythm, S1 and S2 normal, no murmur, rub or gallop  Abdomen: Soft, nontender, nondistended bowel sounds active  Extremities: No cyanosis, clubbing, or edema groin soft no hematoma.  Pulses: 2+ and symmetric all extremities  Skin:  No rashes or lesions  Neuro/psych: A&O x3. CN II through XII are grossly intact with appropriate affect      Results Review:      I reviewed the patient's new clinical results.    CBC    Results from last 7 days   Lab Units 11/13/21  1002   WBC 10*3/mm3 8.83   HEMOGLOBIN g/dL 16.6*   PLATELETS 10*3/mm3 243     Cr Clearance Estimated Creatinine Clearance: 118.7 mL/min (by C-G formula based on SCr of 0.63 mg/dL).  Coag     HbA1C No results found for: HGBA1C  Blood Glucose   Glucose   Date/Time Value Ref Range Status   11/16/2021 1234 186 (H) 70 - 105 mg/dL Final     Comment:     Serial Number: " [id number]:  [id number]   11/16/2021 1053 148 (H) 70 - 105 mg/dL Final     Comment:     Serial Number: [id number]:  [id number]   11/16/2021 0634 137 (H) 70 - 105 mg/dL Final     Comment:     Serial Number: [id number]:  [id number]     Infection     CMP   Results from last 7 days   Lab Units 11/13/21  1002   SODIUM mmol/L 139   POTASSIUM mmol/L 5.1   CHLORIDE mmol/L 102   CO2 mmol/L 25.2   BUN mg/dL 13   CREATININE mg/dL 0.63   GLUCOSE mg/dL 125*   ALBUMIN g/dL 4.70   BILIRUBIN mg/dL 0.4   ALK PHOS U/L 42   AST (SGOT) U/L 24   ALT (SGPT) U/L 10     ABG      UA      SHILA  No results found for: POCMETH, POCAMPHET, POCBARBITUR, POCBENZO, POCCOCAINE, POCOPIATES, POCOXYCODO, POCPHENCYC, POCPROPOXY, POCTHC, POCTRICYC  Lysis Labs   Results from last 7 days   Lab Units 11/13/21  1002   HEMOGLOBIN g/dL 16.6*   PLATELETS 10*3/mm3 243   CREATININE mg/dL 0.63     Radiology(recent) XR Chest 1 View    Result Date: 11/16/2021  No acute process.  Electronically Signed By-Christo Alvarez MD On:11/16/2021 4:04 PM This report was finalized on [id number] by  Christo Alvarez MD.              Assessment/Plan     Persistent atrial fibrillation (HCC)    Hypertension    Snoring         I discussed the patient's findings and my recommendations with patient, significant other and attending nurse.         Hospital Course  Patient is a 61 y.o. female presented for elective radiofrequency ablation for symptomatic atrial fibrillation.  Please see full procedure note for specific details.  Patient was monitored in the outpatient cardiovascular unit overnight.  She maintained heart rate in the 50s throughout the night.  Nursing states when she turned over her heart rate briefly dipped to the 40s then recovered quickly.  Upon my evaluation this morning she reports midsternal chest discomfort that radiates outwardly with inspiration.  Her chest wall is nontender to palpation.  She also reports discomfort in her throat.  She denies any  "shortness of breath, nausea or vomiting.  She denies any pain at groin cath site.  Dr. Baer inpatient room to evaluate alongside of myself.    Patient was seen in consultation by pulmonology NP for treatment of her sleep apnea and repeat sleep study.  Per consult note:  She isn't sure she wants to \"mess with a cpap machine especially if insurance will not cover it\". She also expressed she didn't want to do another sleep study because of all the \"wires and cords\". I explained that sleep study could be done in the convienence of her own home and she could consider seeing Dr. Ferrari outpatient and the office staff can assist with finding out if insurance will cover a machine or not. She is going to \"think about it\".       Dr. Baer recommends antibiotic x7 days for her cough and scant brown sputum produced this morning.  Recommend salt water gargles 4 times daily for sore throat, Tessalon Perles for cough-patient states she has at home.  Also recommend Protonix-patient states she will not take.  Discharge instructions reviewed with the patient.  Questions were answered.  Off work until evaluated in our office in 1 week-on ADT    Past Medical History:     Past Medical History:   Diagnosis Date   • Atrial fibrillation (HCC)    • Diabetes mellitus (HCC)    • Hyperlipidemia        Past Surgical History:     Past Surgical History:   Procedure Laterality Date   • CRYOABLATION  11/16/2021   • SHOULDER SURGERY         Social History:   Social History     Socioeconomic History   • Marital status:    Tobacco Use   • Smoking status: Current Every Day Smoker     Packs/day: 1.00   • Smokeless tobacco: Never Used   Vaping Use   • Vaping Use: Never used   Substance and Sexual Activity   • Alcohol use: Not Currently   • Drug use: Never   • Sexual activity: Defer       Procedures Performed    Procedure(s):  EP/Ablation  Intracardiac echocardiogram  Cardioversion  -------------------       Consults:   Consults     Date and " "Time Order Name Status Description    11/16/2021 10:48 AM Inpatient Pulmonology Consult Completed           Condition on Discharge: Stable    Discharge Disposition: Home  Home or Self Care    Discharge Medications     Discharge Medications      New Medications      Instructions Start Date   amoxicillin-clavulanate 500-125 MG per tablet  Commonly known as: Augmentin   500 mg, Oral, 2 Times Daily         Continue These Medications      Instructions Start Date   acetaminophen 500 MG tablet  Commonly known as: TYLENOL   500 mg, Oral, Every 6 Hours PRN      citalopram 20 MG tablet  Commonly known as: CeleXA   20 mg, Oral, Daily      Eliquis 5 MG tablet tablet  Generic drug: apixaban   5 mg, Oral, 2 Times Daily      fenofibrate micronized 134 MG capsule  Commonly known as: LOFIBRA   134 mg, Oral, Every Night at Bedtime      glimepiride 4 MG tablet  Commonly known as: AMARYL   4 mg, Oral, 2 Times Daily, BID      lisinopril 5 MG tablet  Commonly known as: PRINIVIL,ZESTRIL   5 mg, Oral, Daily      PROBIOTIC-10 PO   1 capsule, Oral, Every Evening             Discharge Diet:   Regular    Activity at Discharge: Routine post ablation discharge instructions    Follow-up Appointments  No future appointments.  [unfilled]    Test Results Pending at Discharge       Risk for Readmission (LACE) Score: 2 (11/17/2021  6:01 AM)          GARLAND Fuller  11/17/21  08:47 EST    Time: Discharge 20 min      EMR Dragon/Transcription:   \"Dictated utilizing Dragon dictation\".   Electronically signed by GARLAND Fuller, 11/17/21, 8:29 AM EST.    "

## 2021-11-17 NOTE — PROGRESS NOTES
"Daily Progress Note        Persistent atrial fibrillation (HCC)    Hypertension    Snoring      Assessment  Sleep Apnea  Snoring  Obesity  Current daily smoker     HLD  Afib  DM  Congenital adrenal hyperplasia     Discussed with patient about sleep apnea. She reports having been diagnosed with it in the past. She isn't sure she wants to \"mess with a cpap machine especially if insurance will not cover it\". She also expressed she didn't want to do another sleep study because of all the \"wires and cords\". I explained that sleep study could be done in the convienence of her own home and she could consider seeing Dr. Ferrari outpatient and the office staff can assist with finding out if insurance will cover a machine or not. She is going to \"think about it\".     Plan           LOS: 0 days     Subjective         Objective     Vital signs for last 24 hours:  Vitals:    11/16/21 1845 11/16/21 1847 11/16/21 2230 11/17/21 0420   BP: 104/46  123/58 116/62   BP Location:       Patient Position:       Pulse: 62 61 59 64   Resp:       Temp: 98 °F (36.7 °C)      TempSrc: Infrared      SpO2: (!) 87% 95%     Weight:       Height:           Intake/Output last 3 shifts:  I/O last 3 completed shifts:  In: 2150 [P.O.:1200; I.V.:950]  Out: -   Intake/Output this shift:  No intake/output data recorded.      Radiology  Imaging Results (Last 24 Hours)       Procedure Component Value Units Date/Time    XR Chest 1 View [[id number]] Collected: 11/16/21 1602     Updated: 11/16/21 1606    Narrative:         DATE OF EXAM:   11/16/2021 3:39 PM     PROCEDURE:   XR CHEST 1 VW-     INDICATIONS:   Dyspnea; I48.19-Other persistent atrial fibrillation; I10-Essential  (primary) hypertension; R06.83-Snoring     COMPARISON:  No Comparisons Available     TECHNIQUE:   Portable chest radiograph.     FINDINGS:    The cardiomediastinal silhouette is within normal limits. The lungs are  without consolidation. There is no pneumothorax, focal consolidation, or  large " pleural effusion. Osseous structures grossly intact.        Impression:      No acute process.      Electronically Signed By-Christo Alvarez MD On:11/16/2021 4:04 PM  This report was finalized on [id number] by  Christo Alvarez MD.            Labs:  Results from last 7 days   Lab Units 11/13/21  1002   WBC 10*3/mm3 8.83   HEMOGLOBIN g/dL 16.6*   HEMATOCRIT % 49.9*   PLATELETS 10*3/mm3 243     Results from last 7 days   Lab Units 11/13/21  1002   SODIUM mmol/L 139   POTASSIUM mmol/L 5.1   CHLORIDE mmol/L 102   CO2 mmol/L 25.2   BUN mg/dL 13   CREATININE mg/dL 0.63   CALCIUM mg/dL 10.0   BILIRUBIN mg/dL 0.4   ALK PHOS U/L 42   ALT (SGPT) U/L 10   AST (SGOT) U/L 24   GLUCOSE mg/dL 125*         Results from last 7 days   Lab Units 11/13/21  1002   ALBUMIN g/dL 4.70             Results from last 7 days   Lab Units 11/13/21  1002   MAGNESIUM mg/dL 2.1                   Meds:   SCHEDULE  apixaban, 5 mg, Oral, BID  citalopram, 20 mg, Oral, Daily  glipizide, 10 mg, Oral, QAM AC  lisinopril, 5 mg, Oral, Daily      Infusions  sodium chloride, 30 mL/hr, Last Rate: 30 mL/hr (11/16/21 0653)      PRNs    acetaminophen    HYDROcodone-acetaminophen    Morphine **AND** naloxone    ondansetron    Physical Exam:  Physical Exam    ROS  Review of Systems        I have reviewed current clinicals.     Electronically signed by GARLAND Nick, 11/17/21, 7:19 AM EST.

## 2021-12-13 ENCOUNTER — OFFICE VISIT (OUTPATIENT)
Dept: CARDIOLOGY | Facility: CLINIC | Age: 61
End: 2021-12-13

## 2021-12-13 VITALS
BODY MASS INDEX: 38.45 KG/M2 | WEIGHT: 245 LBS | OXYGEN SATURATION: 99 % | HEART RATE: 67 BPM | HEIGHT: 67 IN | DIASTOLIC BLOOD PRESSURE: 79 MMHG | SYSTOLIC BLOOD PRESSURE: 150 MMHG

## 2021-12-13 DIAGNOSIS — I10 HYPERTENSION, UNSPECIFIED TYPE: ICD-10-CM

## 2021-12-13 DIAGNOSIS — E78.5 HYPERLIPIDEMIA, UNSPECIFIED HYPERLIPIDEMIA TYPE: ICD-10-CM

## 2021-12-13 DIAGNOSIS — R06.83 SNORING: ICD-10-CM

## 2021-12-13 DIAGNOSIS — Z86.79 STATUS POST ABLATION OF ATRIAL FIBRILLATION: ICD-10-CM

## 2021-12-13 DIAGNOSIS — Z98.890 STATUS POST ABLATION OF ATRIAL FIBRILLATION: ICD-10-CM

## 2021-12-13 DIAGNOSIS — I48.19 PERSISTENT ATRIAL FIBRILLATION (HCC): Primary | ICD-10-CM

## 2021-12-13 PROCEDURE — 93000 ELECTROCARDIOGRAM COMPLETE: CPT | Performed by: INTERNAL MEDICINE

## 2021-12-13 PROCEDURE — 99214 OFFICE O/P EST MOD 30 MIN: CPT | Performed by: INTERNAL MEDICINE

## 2021-12-13 NOTE — PROGRESS NOTES
CC--persistent sx AF, HTN    Sub--- developed persistent atrial fibrillation with prior failed cardioversion with symptoms of dyspnea and fatigue.  Patient underwent AF ablation and was found to have severe left atrial enlargement and post ablation had first-degree AV block.  Pulmonary consultation was done to evaluate sleep apnea and patient is yet to see the sleep apnea specialist since she has not decided on sleep apnea evaluation.  She has no new symptoms and comes in for evaluation after ablation.  Has HTN, congenital adrenal hyperplasia and prior CV work up negative  [place]  No angina  No lkeg edema  Has DM   no CVA or bleeding issues    Past Medical History:   Diagnosis Date   • Atrial fibrillation (HCC)    • Diabetes mellitus (HCC)    • Hyperlipidemia      Past Surgical History:   Procedure Laterality Date   • SHOULDER SURGERY       Family History   Problem Relation Age of Onset   • Heart failure Mother    • Hypertension Father          Review of Systems   General:  no for fatigue and tiredness  Eyes: No redness  Cardiovascular: No chest pain, no palpitations      Physical Exam  VITALS REVIEWED    General:      well developed, well nourished, in no acute distress.    Head:      normocephalic and atraumatic.    Eyes:      PERRL/EOM intact, conjunctiva and sclera clear with out nystagmus.    Neck:      no masses, thyromegaly,  trachea central with normal respiratory effort and PMI displaced laterally  Lungs:      clear bilaterally to auscultation.    Heart:       underlying sinus rhythm and without any murmurs gallops or rubs        A/P    Persistent sx AF--post AF ablation with severe left atrial enlargement --sinus rhythm with first-degree AV block   Snoring with obesity--BMI of 38.37 and patient was strongly encouraged to make outpatient appointment with the pulmonologist for sleep apnea evaluation.    History of diabetes under treatment  History of hyperlipidemia under treatment  CHADS vasc  score--female, diastolic chf, htn, DM--4  Clinically doing well and medications reviewed and follow-up in 2 months        ECG 12 Lead    Date/Time: 12/13/2021 12:15 PM  Performed by: Adnres Baer MD  Authorized by: Andres Baer MD   Comparison: compared with previous ECG   Similar to previous ECG  Rhythm: sinus rhythm  Ectopy: atrial premature contractions  Rate: normal  Conduction: 1st degree AV block  Other findings: non-specific ST-T wave changes  Comments: Significant underlying artifacts with PACs and nonspecific ST-T wave changes with underlying first-degree AV block          Electronically signed by Andres Baer MD, 12/13/21, 12:15 PM EST.

## (undated) DEVICE — CABL CATH ABLAT ACHIEVE 196CM 1P/U

## (undated) DEVICE — PREF.GUIDING SHEATH W/MULT.CRV: Brand: PREFACE

## (undated) DEVICE — CATH ABL ARCTIC FRNT ADV 10.5F3.5X28MM

## (undated) DEVICE — Device: Brand: PENTARAY NAV

## (undated) DEVICE — PK TRY HEART CATH 50

## (undated) DEVICE — Device: Brand: REFERENCE PATCH CARTO 3

## (undated) DEVICE — ST INTRO PERFORMER W/GW J/TP .038IN 14FR

## (undated) DEVICE — PROVE COVER: Brand: UNBRANDED

## (undated) DEVICE — Device

## (undated) DEVICE — PAD E/S GRND SGL/FOIL 9FT/CORD DISP

## (undated) DEVICE — Device: Brand: WEBSTER CS

## (undated) DEVICE — Device: Brand: RFP-100A CONNECTOR CABLE

## (undated) DEVICE — CATH ABL ACHIEVE MP 3.3F20MM 165CM

## (undated) DEVICE — CABL CONN CATH EP COAXL UMB 72IN

## (undated) DEVICE — ST ACC MICROPUNCTURE STFF/CANN PLAT/TP 4F 21G 40CM

## (undated) DEVICE — SHEATH FLXCATH STEER 12FR

## (undated) DEVICE — Device: Brand: SOUNDSTAR

## (undated) DEVICE — Device: Brand: NRG TRANSSEPTAL NEEDLE

## (undated) DEVICE — TBG IV DRIP CHAMBER MACRO SGL 72IN

## (undated) DEVICE — GW XCHG AMPLTZ XSTIF PTFE CRV .035IN 3X180CM

## (undated) DEVICE — Device: Brand: CARTO 3

## (undated) DEVICE — ELECTRD DEFIB M/FUNC PROPADZ RADIOL 2PK

## (undated) DEVICE — PINNACLE INTRODUCER SHEATH: Brand: PINNACLE

## (undated) DEVICE — CABL CONN CATH EP UMB 48IN